# Patient Record
Sex: MALE | Race: WHITE | Employment: FULL TIME | URBAN - NONMETROPOLITAN AREA
[De-identification: names, ages, dates, MRNs, and addresses within clinical notes are randomized per-mention and may not be internally consistent; named-entity substitution may affect disease eponyms.]

---

## 2021-10-13 ENCOUNTER — HOSPITAL ENCOUNTER (OUTPATIENT)
Age: 66
Discharge: HOME | End: 2021-10-15
Payer: COMMERCIAL

## 2021-10-13 ENCOUNTER — HOSPITAL ENCOUNTER (OUTPATIENT)
Dept: GENERAL RADIOLOGY | Age: 66
Discharge: HOME | End: 2021-10-15
Payer: COMMERCIAL

## 2021-10-13 DIAGNOSIS — M54.2 CERVICAL PAIN: ICD-10-CM

## 2021-10-13 PROCEDURE — 72050 X-RAY EXAM NECK SPINE 4/5VWS: CPT

## 2023-10-31 ENCOUNTER — TELEPHONE (OUTPATIENT)
Dept: PHARMACY | Facility: CLINIC | Age: 68
End: 2023-10-31

## 2023-10-31 NOTE — TELEPHONE ENCOUNTER
[facility]: ADHERENCE REVIEW  Identified care gap per [employer]: fills at [facility]: Statin adherence    ASSESSMENT  [address] Records claims through 10/23/2023 (Prior Year [address] = not reported; YTD [address] = 74%; Potential Fail Date: 11/2/23): Simvastatin 20mg last filled on 7/25/23 for 100 day supply. Next refill due: 11/2/23    Prescribed sig:  QOD    Due now    Per [facility]: was shipped on 10/25/23    Lab Results   Component Value Date    CHOL 141 05/18/2023    TRIG 114 05/18/2023    HDL 47 05/18/2023    LDLCALC 71 05/18/2023     ALT   Date Value Ref Range Status   05/18/2023 22 5 - 50 U/L Final     AST   Date Value Ref Range Status   05/18/2023 29 9 - 50 U/L Final     The 10-year ASCVD risk score (Sonya CLARK, et al., 2019) is: 16.3%    Values used to calculate the score:      Age: 80 years      Sex: Male      Is Non- : No      Diabetic: No      Tobacco smoker: No      Systolic Blood Pressure: 555 mmHg      Is BP treated: Yes      HDL Cholesterol: 47 mg/dL      Total Cholesterol: 141 mg/dL     PLAN  Per insurer report, LIS-0 - co-pays are based on tiers and patient is subject to coverage gap. The following are interventions that have been identified:   Simvastatin 20mg was refilled timely    No patient out reach planned at this time. Last Visit: 9/25/23  Next Visit: none      Tiffanie Gill CP.    [facility] free: [phone]     For Pharmacy Admin Tracking Only    Program: Destiny in place:  No  Gap Closed?: No   Time Spent (min): 15

## 2024-02-23 ENCOUNTER — HOSPITAL ENCOUNTER (OUTPATIENT)
Dept: PHYSICAL THERAPY | Age: 69
Setting detail: THERAPIES SERIES
Discharge: HOME | End: 2024-02-23

## 2024-02-29 ENCOUNTER — HOSPITAL ENCOUNTER (OUTPATIENT)
Dept: PHYSICAL THERAPY | Age: 69
Setting detail: THERAPIES SERIES
Discharge: HOME | End: 2024-02-29
Payer: COMMERCIAL

## 2024-02-29 PROCEDURE — 97140 MANUAL THERAPY 1/> REGIONS: CPT

## 2024-02-29 PROCEDURE — 97110 THERAPEUTIC EXERCISES: CPT

## 2024-02-29 NOTE — PROGRESS NOTES
Term Goals  Time Frame for Long Term Goals : 4 weeks  Long Term Goal 1: Patient will be independent and compliant with a HEP  Long Term Goal 2: Patient will improve L cervical spine rotation to >75* without symptoms for work activities  Long Term Goal 3: Patient will deny facial numbness with L rotation and flexion with work activities.  Long Term Goal 4: Patient will improve deep neck flexor endurance to be able to perform a supine chin tuck and lift for >20 seconds.  Long Term Goal 5: Patient will report 70% improvement in overall symptoms and function.    Minutes Tracking:  Time In: 0759  Time Out: 0845  Minutes: 46  Timed Code Treatment Minutes: 44 Minutes    Yonatan Reynolds, NABILA     Date: 2/29/2024

## 2024-03-01 ENCOUNTER — HOSPITAL ENCOUNTER (OUTPATIENT)
Dept: PHYSICAL THERAPY | Age: 69
Setting detail: THERAPIES SERIES
Discharge: HOME | End: 2024-03-01
Payer: COMMERCIAL

## 2024-03-01 PROCEDURE — 97140 MANUAL THERAPY 1/> REGIONS: CPT

## 2024-03-01 PROCEDURE — 97110 THERAPEUTIC EXERCISES: CPT

## 2024-03-01 NOTE — PROGRESS NOTES
Phone: [phone]                 [facility]           Fax: [phone]                           Outpatient Physical Therapy                                                                            Daily Note    Patient: Cuong Taylor : 1955  CSN #: [id number]   Referring Physician: Ruben Pop MD  Date: 3/1/2024     Treatment Diagnosis: Neck pain with mobility deficits    Onset Date: 23  PT Insurance Information: Medicare  Total # of Visits Approved: 8 Per Physician Order  Total # of Visits to Date: 3  No Show: 0  Canceled Appointment: 0    24 Plan of Care/Recert Due    Pre-Treatment Pain:  0/10  Subjective: Pt states he is still having the numbness but has been doing his exercises.  Pt denies pain at rest.    Exercises:  Exercise 2: UBE retro 6 mins  Exercise 3: CROM 15x ea  Exercise 4: roller stretch 10x10 sec  Exercise 5: UT stretch 3x30  Exercise 6: corner stretch 3x30  Exercise 7: LAE/ Rows GTB 15x ea  Exercise 8: corner stretch 5x 15 sec    Manual:  Manual Traction: Cevical traction  Soft Tissue Mobilizaton: To L upper trapezius, gentle SCM to avoid reproduction of symptoms    Assessment  Assessment: Continued deep tissue massage tocervical paraspinals with fair tolerance noted.  Pt remains point tender along upper cervical region.    Activity Tolerance  Activity Tolerance: Patient tolerated treatment well    Patient Education  Patient Education: Posture  Pt verbalized/demonstrated good understanding:     [x] Yes         [] No, pt required further clarification.     Post Treatment Pain:  0/10    Plan  Plan Frequency: 2  Plan weeks: 4     Goals  (Total # of Visits to Date: 3)      Short Term Goals  Time Frame for Short Term Goals: 2 weeks  Short Term Goal 1: Patient will be initiated with a HEP  Short Term Goal 2: Patient will tolerate manual interventions and modalities to decrease muscular tension and hypertonicity    Long Term Goals  Time Frame for Long Term

## 2024-03-07 ENCOUNTER — HOSPITAL ENCOUNTER (OUTPATIENT)
Dept: PHYSICAL THERAPY | Age: 69
Setting detail: THERAPIES SERIES
Discharge: HOME | End: 2024-03-07
Payer: COMMERCIAL

## 2024-03-07 PROCEDURE — 97140 MANUAL THERAPY 1/> REGIONS: CPT

## 2024-03-07 PROCEDURE — 97110 THERAPEUTIC EXERCISES: CPT

## 2024-03-07 NOTE — PROGRESS NOTES
Phone: [phone]                 [facility]           Fax: [phone]                           Outpatient Physical Therapy                                                                            Daily Note    Patient: Cuong Taylor : 1955  CSN #: [id number]   Referring Physician: Ruben Pop MD  Date: 3/7/2024     Treatment Diagnosis: Neck pain with mobility deficits    Onset Date: 23  PT Insurance Information: Medicare  Total # of Visits Approved: 8 Per Physician Order  Total # of Visits to Date: 4  No Show: 0  Canceled Appointment: 0    24 Plan of Care/Recert Due    Pre-Treatment Pain:  3/10  Subjective: Pt reports he had a rough day yesterday but he ran out of his celebrex and had a long day at work.  Pt rates current pain a 2-3/10.    Exercises:  Exercise 2: UBE retro 6 mins  Exercise 3: CROM 15x ea  Exercise 4: roller stretch 10x10 sec  Exercise 5: UT stretch 3x30  Exercise 6: corner stretch 3x30    Manual:  Manual Traction: Cevical traction  Soft Tissue Mobilizaton: To L upper trapezius, gentle SCM to avoid reproduction of symptoms    Assessment  Assessment: Pt continues to display limitations with L cervical rotation with hard guarded end feel.  Will continue to work on CROM as tolerable.    Activity Tolerance  Activity Tolerance: Patient tolerated treatment well    Patient Education  Patient Education: Continue current HEP.  Pt verbalized/demonstrated good understanding:     [x] Yes         [] No, pt required further clarification.     Post Treatment Pain:  2/10    Plan  Plan Frequency: 2  Plan weeks: 4     Goals  (Total # of Visits to Date: 4)      Short Term Goals  Time Frame for Short Term Goals: 2 weeks  Short Term Goal 1: Patient will be initiated with a HEP -MET  Short Term Goal 2: Patient will tolerate manual interventions and modalities to decrease muscular tension and hypertonicity    Long Term Goals  Time Frame for Long Term Goals : 4 weeks  Long

## 2024-03-08 ENCOUNTER — HOSPITAL ENCOUNTER (OUTPATIENT)
Dept: PHYSICAL THERAPY | Age: 69
Setting detail: THERAPIES SERIES
Discharge: HOME | End: 2024-03-08
Payer: COMMERCIAL

## 2024-03-08 PROCEDURE — 97110 THERAPEUTIC EXERCISES: CPT

## 2024-03-08 PROCEDURE — 97140 MANUAL THERAPY 1/> REGIONS: CPT

## 2024-03-08 NOTE — PROGRESS NOTES
Phone: [phone]                 [facility]           Fax: [phone]                           Outpatient Physical Therapy                                                                            Daily Note    Patient: Cuong Talyor : 1955  CSN #: [id number]   Referring Physician: Ruben Pop MD  Date: 3/8/2024       Treatment Diagnosis: Neck pain with mobility deficits    Onset Date: 23  PT Insurance Information: Medicare  Total # of Visits Approved: 8 Per Physician Order  Total # of Visits to Date: 5  No Show: 0  Canceled Appointment: 0    24 Plan of Care/Recert Due    Pre-Treatment Pain:  3/10  Subjective: Pt states he is feeling a little better than what he did yesterday. Pt rates current pain a 2-3/10.    Exercises:  Exercise 2: UBE retro 6 mins  Exercise 3: CROM 15x ea  Exercise 4: roller stretch 10x10 sec  Exercise 5: UT stretch 3x30  Exercise 6: corner stretch 3x30  Exercise 7: LAE/ Rows BTB 15x ea  Exercise 8: corner stretch 5x 15 sec    Manual:  Manual Traction: Cevical traction  Soft Tissue Mobilizaton: To L upper trapezius, gentle SCM to avoid reproduction of symptoms    Assessment  Assessment: COntinued postural support advancements today with fair tolerance noted.   Numbness and tingle sensation remains with L cervical roataion.    Activity Tolerance  Activity Tolerance: Patient tolerated treatment well    Patient Education  Patient Education: Continue current HEP.  Pt verbalized/demonstrated good understanding:     [x] Yes         [] No, pt required further clarification.     Post Treatment Pain:  2/10    Plan  Plan Frequency: 2  Plan weeks: 4     Goals  (Total # of Visits to Date: 5)      Short Term Goals  Time Frame for Short Term Goals: 2 weeks  Short Term Goal 1: Patient will be initiated with a HEP -MET  Short Term Goal 2: Patient will tolerate manual interventions and modalities to decrease muscular tension and hypertonicity -MET    Long Term

## 2024-03-14 ENCOUNTER — HOSPITAL ENCOUNTER (OUTPATIENT)
Dept: PHYSICAL THERAPY | Age: 69
Setting detail: THERAPIES SERIES
Discharge: HOME | End: 2024-03-14
Payer: COMMERCIAL

## 2024-03-14 NOTE — PROGRESS NOTES
[facility]           Phone: [phone]             Outpatient Physical Therapy  Fax: [phone]                                           Date: 3/14/2024  Patient: Cuong Taylor : 1955 CSN #: [id number]   Referring Physician: Ruben Pop MD      [] Plan of Care   [x] Updated Plan of Care    Dates of Service to Include: 3/14/2024 to 24    Diagnosis:       Rehab (Treatment) Diagnosis:  Neck pain with mobility deficits             Onset Date:  23    Attendance  Total # of Visits to Date: 5 No Show: 0 Canceled Appointment: 0    Assessment  Body Structures, Functions, Activity Limitations Requiring Skilled Therapeutic Intervention: Decreased ROM, Decreased tolerance to work activity, Decreased endurance, Decreased high-level IADLs, Increased pain, Decreased posture  Assessment: Patient has attended his initial evaluation and 5 follow-up visits and today reports increased pain into the left side of his head.  He reports continued facial numbness that is worsened by L cervical spine rotation along with cervical spine flexion.  He has been performing his HEP and has been seen in the clinic for manual interventions to improve upper cervical spine mobility without any relief of symptoms or improvement in cervical spine ROM.  He previously received chiropractic care without any change in symptoms as well.  He reports he did feel dizzy after his last therapy visit for a short period.  He demonstrated decreased L cervical spine ROM today measuring 55* compared to 64* at initial evaluation.  He reports his blood pressure has been elevated recently which he contributes to being on Celebrex.  Although he has a mechanical lifting KRISHAN concern is noted for vascular compromise.  Patient was educated to return to referring physician and will be put on hold at this time.      Goals  Short Term Goals  Time

## 2024-03-15 ENCOUNTER — HOSPITAL ENCOUNTER (OUTPATIENT)
Dept: PHYSICAL THERAPY | Age: 69
Setting detail: THERAPIES SERIES
End: 2024-03-15
Payer: COMMERCIAL

## 2024-03-21 ENCOUNTER — APPOINTMENT (OUTPATIENT)
Dept: PHYSICAL THERAPY | Age: 69
End: 2024-03-21
Payer: COMMERCIAL

## 2024-03-22 ENCOUNTER — APPOINTMENT (OUTPATIENT)
Dept: PHYSICAL THERAPY | Age: 69
End: 2024-03-22
Payer: COMMERCIAL

## 2024-03-25 ENCOUNTER — HOSPITAL ENCOUNTER (OUTPATIENT)
Dept: CT IMAGING | Age: 69
Discharge: HOME | End: 2024-03-27
Attending: INTERNAL MEDICINE
Payer: COMMERCIAL

## 2024-03-25 DIAGNOSIS — M54.2 CERVICALGIA: ICD-10-CM

## 2024-03-25 DIAGNOSIS — R20.0 FACIAL NUMBNESS: ICD-10-CM

## 2024-03-25 PROCEDURE — 6360000004 HC RX CONTRAST MEDICATION: Performed by: INTERNAL MEDICINE

## 2024-03-25 PROCEDURE — 70498 CT ANGIOGRAPHY NECK: CPT

## 2024-03-25 RX ADMIN — IOPAMIDOL 75 ML: 755 INJECTION, SOLUTION INTRAVENOUS at 07:49

## 2024-04-12 ENCOUNTER — TELEPHONE (OUTPATIENT)
Age: 69
End: 2024-04-12

## 2024-06-17 ENCOUNTER — CLINICAL DOCUMENTATION (OUTPATIENT)
Dept: PHYSICAL THERAPY | Age: 69
End: 2024-06-17

## 2024-06-17 NOTE — FLOWSHEET NOTE
Phone: [phone]                 [facility]          Fax: [phone]                            Outpatient Physical Therapy                                                                    Discharge Summary    Patient: Cuong Taylor  : 1955  [facility] #: [id number]   Referring Physician: Ruben Pop MD       Date Treatment Initiated: 24  Date of Last Treatment: 3/14/24      Frequency/Duration  Plan Frequency: 2                   Plan weeks: 4       Treatment Received    [x] HP/CP      [x] Electrical Stim   [x] Therapeutic Exercise      [x] Gait Training  [] Aquatics   [x] Ultrasound         [x] Patient Education/HEP   [x] Manual Therapy  [x] Traction    [x] Neuro-caren        [x] Soft Tissue Mobs            [x] Therapeutic Activity  [] Iontophoresis                        [] Orthotic casting/fitting      [x] Dry Needling  [] Blood Flow Restriction       [] Vasopneumatic Compression       Assessment  Body Structures, Functions, Activity Limitations Requiring Skilled Therapeutic Intervention: Decreased ROM, Decreased tolerance to work activity, Decreased endurance, Decreased high-level IADLs, Increased pain, Decreased posture  Assessment: Patient has attended his initial evaluation and 5 follow-up visits and today reports increased pain into the left side of his head.  He reports continued facial numbness that is worsened by L cervical spine rotation along with cervical spine flexion.  He has been performing his HEP and has been seen in the clinic for manual interventions to improve upper cervical spine mobility without any relief of symptoms or improvement in cervical spine ROM.  He previously received chiropractic care without any change in symptoms as well.  He reports he did feel dizzy after his last therapy visit for a short period.  He demonstrated decreased L cervical spine ROM today measuring 55* compared to 64* at initial evaluation.  He reports his blood pressure has been

## 2025-03-18 ENCOUNTER — TELEPHONE (OUTPATIENT)
Age: 70
End: 2025-03-18

## 2025-03-18 NOTE — TELEPHONE ENCOUNTER
Referral to pain management from 4/2024 popped up in our WQ again. At that time, staff had called the patient 3 times to schedule but seems like they were unable to get ahold of the patient. He did have an appt scheduled in 8/2024 with Dr. Forrester where he cancelled. I called the patient regarding referral incase it had been replaced, patient states he does not have any pain and denied referral.